# Patient Record
Sex: FEMALE | Race: AMERICAN INDIAN OR ALASKA NATIVE | Employment: FULL TIME | URBAN - METROPOLITAN AREA
[De-identification: names, ages, dates, MRNs, and addresses within clinical notes are randomized per-mention and may not be internally consistent; named-entity substitution may affect disease eponyms.]

---

## 2020-05-18 ENCOUNTER — APPOINTMENT (OUTPATIENT)
Dept: GENERAL RADIOLOGY | Facility: HOSPITAL | Age: 55
DRG: 177 | End: 2020-05-18
Attending: EMERGENCY MEDICINE
Payer: COMMERCIAL

## 2020-05-18 ENCOUNTER — HOSPITAL ENCOUNTER (INPATIENT)
Facility: HOSPITAL | Age: 55
LOS: 6 days | Discharge: HOME | DRG: 177 | End: 2020-05-24
Attending: EMERGENCY MEDICINE | Admitting: HOSPITALIST
Payer: COMMERCIAL

## 2020-05-18 DIAGNOSIS — J12.82 PNEUMONIA DUE TO COVID-19 VIRUS: Primary | ICD-10-CM

## 2020-05-18 DIAGNOSIS — U07.1 PNEUMONIA DUE TO COVID-19 VIRUS: Primary | ICD-10-CM

## 2020-05-18 DIAGNOSIS — R09.02 HYPOXIA: ICD-10-CM

## 2020-05-18 PROCEDURE — 71045 X-RAY EXAM CHEST 1 VIEW: CPT | Performed by: EMERGENCY MEDICINE

## 2020-05-18 PROCEDURE — 99223 1ST HOSP IP/OBS HIGH 75: CPT | Performed by: HOSPITALIST

## 2020-05-18 RX ORDER — MAGNESIUM OXIDE 400 MG (241.3 MG MAGNESIUM) TABLET
3 TABLET NIGHTLY
Status: DISCONTINUED | OUTPATIENT
Start: 2020-05-18 | End: 2020-05-24

## 2020-05-18 RX ORDER — ONDANSETRON 2 MG/ML
4 INJECTION INTRAMUSCULAR; INTRAVENOUS EVERY 6 HOURS PRN
Status: DISCONTINUED | OUTPATIENT
Start: 2020-05-18 | End: 2020-05-24

## 2020-05-18 RX ORDER — SIMVASTATIN 20 MG
20 TABLET ORAL NIGHTLY
COMMUNITY

## 2020-05-18 RX ORDER — ENOXAPARIN SODIUM 100 MG/ML
70 INJECTION SUBCUTANEOUS EVERY 12 HOURS SCHEDULED
Status: DISCONTINUED | OUTPATIENT
Start: 2020-05-18 | End: 2020-05-24

## 2020-05-18 RX ORDER — METHYLPREDNISOLONE SODIUM SUCCINATE 40 MG/ML
40 INJECTION, POWDER, LYOPHILIZED, FOR SOLUTION INTRAMUSCULAR; INTRAVENOUS EVERY 12 HOURS
Status: COMPLETED | OUTPATIENT
Start: 2020-05-18 | End: 2020-05-21

## 2020-05-18 RX ORDER — GLIPIZIDE 10 MG/1
10 TABLET ORAL
Status: ON HOLD | COMMUNITY
End: 2020-05-22

## 2020-05-18 RX ORDER — SODIUM CHLORIDE 0.9 % (FLUSH) 0.9 %
3 SYRINGE (ML) INJECTION AS NEEDED
Status: DISCONTINUED | OUTPATIENT
Start: 2020-05-18 | End: 2020-05-24

## 2020-05-18 RX ORDER — METOCLOPRAMIDE HYDROCHLORIDE 5 MG/ML
10 INJECTION INTRAMUSCULAR; INTRAVENOUS EVERY 8 HOURS PRN
Status: DISCONTINUED | OUTPATIENT
Start: 2020-05-18 | End: 2020-05-24

## 2020-05-18 RX ORDER — ACETAMINOPHEN 325 MG/1
650 TABLET ORAL EVERY 6 HOURS PRN
Status: DISCONTINUED | OUTPATIENT
Start: 2020-05-18 | End: 2020-05-24

## 2020-05-18 RX ORDER — LISINOPRIL 20 MG/1
40 TABLET ORAL DAILY
COMMUNITY

## 2020-05-18 RX ORDER — AMLODIPINE BESYLATE 10 MG/1
5 TABLET ORAL DAILY
COMMUNITY

## 2020-05-18 RX ORDER — DEXTROSE MONOHYDRATE 25 G/50ML
50 INJECTION, SOLUTION INTRAVENOUS
Status: DISCONTINUED | OUTPATIENT
Start: 2020-05-18 | End: 2020-05-24

## 2020-05-19 PROCEDURE — 99233 SBSQ HOSP IP/OBS HIGH 50: CPT | Performed by: HOSPITALIST

## 2020-05-19 PROCEDURE — 99221 1ST HOSP IP/OBS SF/LOW 40: CPT | Performed by: INTERNAL MEDICINE

## 2020-05-19 RX ORDER — ALBUTEROL SULFATE 90 UG/1
1-2 AEROSOL, METERED RESPIRATORY (INHALATION) EVERY 6 HOURS PRN
Status: DISCONTINUED | OUTPATIENT
Start: 2020-05-19 | End: 2020-05-24

## 2020-05-19 RX ORDER — LISINOPRIL 40 MG/1
40 TABLET ORAL DAILY
Status: DISCONTINUED | OUTPATIENT
Start: 2020-05-19 | End: 2020-05-23

## 2020-05-19 RX ORDER — HYDROCODONE BITARTRATE AND HOMATROPINE METHYLBROMIDE ORAL SOLUTION 5; 1.5 MG/5ML; MG/5ML
5 LIQUID ORAL EVERY 4 HOURS PRN
Status: DISCONTINUED | OUTPATIENT
Start: 2020-05-19 | End: 2020-05-24

## 2020-05-19 RX ORDER — ASCORBIC ACID 500 MG
500 TABLET ORAL 2 TIMES DAILY
Status: DISCONTINUED | OUTPATIENT
Start: 2020-05-19 | End: 2020-05-24

## 2020-05-19 RX ORDER — INSULIN ASPART 100 [IU]/ML
12 INJECTION, SOLUTION INTRAVENOUS; SUBCUTANEOUS ONCE
Status: COMPLETED | OUTPATIENT
Start: 2020-05-19 | End: 2020-05-19

## 2020-05-19 RX ORDER — ZINC SULFATE 50(220)MG
220 CAPSULE ORAL DAILY
Status: DISCONTINUED | OUTPATIENT
Start: 2020-05-19 | End: 2020-05-24

## 2020-05-19 RX ORDER — AMLODIPINE BESYLATE 5 MG/1
5 TABLET ORAL DAILY
Status: DISCONTINUED | OUTPATIENT
Start: 2020-05-19 | End: 2020-05-23

## 2020-05-19 RX ORDER — CALCIUM CARBONATE 200(500)MG
500 TABLET,CHEWABLE ORAL
Status: DISCONTINUED | OUTPATIENT
Start: 2020-05-19 | End: 2020-05-23

## 2020-05-19 RX ORDER — HYDROCODONE BITARTRATE AND ACETAMINOPHEN 5; 325 MG/1; MG/1
1 TABLET ORAL EVERY 6 HOURS PRN
Status: DISCONTINUED | OUTPATIENT
Start: 2020-05-19 | End: 2020-05-24

## 2020-05-19 NOTE — PLAN OF CARE
Problem: Diabetes/Glucose Control  Goal: Glucose maintained within prescribed range  Description  INTERVENTIONS:  - Monitor Blood Glucose as ordered  - Assess for signs and symptoms of hyperglycemia and hypoglycemia  - Administer ordered medications to m Glucose as ordered  - Assess for signs and symptoms of hyperglycemia and hypoglycemia  - Administer ordered medications to maintain glucose within target range  - Assess barriers to adequate nutritional intake and initiate nutrition consult as needed  - In

## 2020-05-19 NOTE — ED NOTES
Orders for admission, patient is aware of plan and ready to go upstairs.  Any questions, please call ED RN LEANDRO SALGADO  at [address]

## 2020-05-19 NOTE — PLAN OF CARE
A/Ox4. Pt ambulating in room freely. Fall risk precautions appropriate for pt: bed in lowest position, call light within reach. Tachycardic and dyspnea with exertion. On 2L of Oxygen. Incentive Spirometer implemented. Pt instructed on how to use it.   Tel perspectives and choices   Outcome: Progressing     Problem: RESPIRATORY - ADULT  Goal: Achieves optimal ventilation and oxygenation  Description  INTERVENTIONS:  - Assess for changes in respiratory status  - Assess for changes in mentation and behavior  -

## 2020-05-19 NOTE — H&P
[facility]    PATIENT'S NAME: Rey Bravo PHYSICIAN: Juan M Brown MD   PATIENT ACCOUNT#:   [id number]    LOCATION:  [place] [address]  MEDICAL RECORD #:   [id number]       YOB: 1965  ADMISSION DATE:       05/18/20 GENERAL:  Alert. Oriented to time, place, and person. Moderate distress. VITAL SIGNS:  Temperature 98.2, pulse 95, respiratory rate 38, blood pressure 162/88, pulse ox 88% on room air, 95% on 2L nasal cannula oxygen. HEENT:  Atraumatic.   Oropharynx

## 2020-05-19 NOTE — PAYOR COMM NOTE
--------------  ADMISSION REVIEW     Payor: [address] [place] [address] #:  [id number]  Authorization Number: [id number]    Admit date: 5/18/20  Admit time: 2143       Admitting Physician: Aruna Arenas MD  Attending Physician:  Kane Bains MD 171/88   Pulse 103   Resp (!) 38   Temp 98.2 °F (36.8 °C)   Temp src Oral   SpO2 (!) 88 %   O2 Device None (Room air)       Current:BP (!) 162/88   Pulse 95   Temp 98.2 °F (36.8 °C) (Oral)   Resp (!) 38   Ht 162.6 cm (5' 4\")   Wt 120.2 kg   SpO2 95%   BMI components:       Result Value    Rapid SARS-CoV-2 by PCR Detected (*)     All other components within normal limits   CBC W/ DIFFERENTIAL - Abnormal; Notable for the following components:    WBC 12.2 (*)     Neutrophil Absolute Prelim 8.07 (*)     Neutrop and findings discussed with patient including need for follow up    Admission disposition: 5/18/2020  8:01 PM         Critical care  A total of 35 minutes of critical care time (exclusive of billable procedures) was administered to manage the patient's res Inflammatory markers still pending. Patient was saturating 88% on room air. Started on oxygen, and she will be admitted to the hospital for further management.      PAST MEDICAL HISTORY:  Morbid obesity, diabetes mellitus type 2, hypertension, hyperlipide through XII are intact. ASSESSMENT AND PLAN:    1. COVID-19 pneumonia with hypoxemia. 2.   Diabetes mellitus type 2.  3.   Morbid obesity. 4.   Hypertension. Patient will be admitted to general medical floor. Droplet isolation.   Oxygen protoco tab TABS 3 mg     Date Action Dose Route User    5/18/2020 2235 Given 3 mg Oral Waunita Kehr, RN      methylPREDNISolone Sodium Succ (Solu-MEDROL) injection 40 mg     Date Action Dose Route User    5/19/2020 0924 Given 40 mg Intravenous Mike Dave

## 2020-05-19 NOTE — ED NOTES
Patient here with increasing SOB, poor appetite. +diarrhea. Patient had positive covid contact. She has attempted to treat at home but today it became worse.

## 2020-05-19 NOTE — CM/SW NOTE
Spoke with the patient about OON status and she states, “my brother was a [facility] and they said they are not equipped to treat these patients.” Pt. requesting to stay at [facility] for [facility] treatment.

## 2020-05-19 NOTE — PROGRESS NOTES
[facility]    Progress Note    Lakeisha Double Patient Status:  Inpatient    1965 MRN [id number]   Location [facility] 5SW/SE Attending Lupillo Gaines MD   Hosp Day # 1 PCP Anders Councilman, MD       Subjective:     Pt Levemir and mealtime insulin increased  - ISS    HTN  - cont lisinopril and norvasc    Morbid obesity  Body mass index is 42.81 kg/m².      dvt proph - lovenox     Code status:  Full    >35 minutes spent    Waqas Jimenez MD  5/19/2020

## 2020-05-19 NOTE — CONSULTS
[facility]    Report of Consultation    Lorie Hayes Patient Status:  Inpatient    1965 MRN [id number]   Location [facility] 5SW/SE Attending Glenn Delgado MD   Hosp Day # 1 PCP Virgel Romberg, MD     Date of Ad Q12H  glucose (DEX4) oral liquid 15 g, 15 g, Oral, Q15 Min PRN    Or  Glucose-Vitamin C (DEX-4) chewable tab 4 tablet, 4 tablet, Oral, Q15 Min PRN    Or  dextrose 50 % injection 50 mL, 50 mL, Intravenous, Q15 Min PRN    Or  glucose (DEX4) oral liquid 30 g, soft, non tender  Extremities: no clubbing, cyanosis, edema  Neurologic: no gross motor deficits  Skin: warm, dry    Results:   Laboratory Data  Lab Results   Component Value Date    WBC 8.5 05/19/2020    HGB 12.9 05/19/2020    HCT 37.0 05/19/2020    PLT 3

## 2020-05-19 NOTE — ED PROVIDER NOTES
Patient Seen in: [facility] AND [facility] Emergency Department      History   Patient presents with:  Dyspnea RAJI SOB    Stated Complaint: sob    HPI    The patient is a 57-year-old female who presents with 13 days of fatigue headaches body aches subjective fev heart sounds. No murmur. Pulmonary:      Effort: Pulmonary effort is normal. Tachypnea present. No accessory muscle usage or respiratory distress. Breath sounds: Normal breath sounds.    Abdominal:      General: Bowel sounds are normal. There is no d LDH   FERRITIN   LACTIC ACID, PLASMA   PROCALCITONIN   RAINBOW DRAW BLUE   RAINBOW DRAW LAVENDER   RAINBOW DRAW LIGHT GREEN   RAINBOW DRAW GOLD     EKG    Rate, intervals and axes as noted on EKG Report.   Rate: 102  Rhythm: Sinus Rhythm  Reading: No ST e

## 2020-05-20 PROCEDURE — 99232 SBSQ HOSP IP/OBS MODERATE 35: CPT | Performed by: INTERNAL MEDICINE

## 2020-05-20 PROCEDURE — 99233 SBSQ HOSP IP/OBS HIGH 50: CPT | Performed by: HOSPITALIST

## 2020-05-20 RX ORDER — ATORVASTATIN CALCIUM 10 MG/1
10 TABLET, FILM COATED ORAL NIGHTLY
Status: DISCONTINUED | OUTPATIENT
Start: 2020-05-20 | End: 2020-05-24

## 2020-05-20 NOTE — PLAN OF CARE
Problem: Diabetes/Glucose Control  Goal: Glucose maintained within prescribed range  Description  INTERVENTIONS:  - Monitor Blood Glucose as ordered  - Assess for signs and symptoms of hyperglycemia and hypoglycemia  - Administer ordered medications to m supplementation based on oxygen saturation or ABGs  - Provide Smoking Cessation handout, if applicable  - Encourage broncho-pulmonary hygiene including cough, deep breathe, Incentive Spirometry  - Assess the need for suctioning and perform as needed  - Ass

## 2020-05-20 NOTE — PLAN OF CARE
Pt up to shower today, CHARLES. Dry cough. Desats to 86% when walking. Recovers back to 95-96 % after rest. Lunch , Dinner ; Dr. Javed Brady notifed both times, increased novolog and levimir.    Problem: Patient/Family Goals  Goal: Patient/Family Long Term G to report SOB or any respiratory difficulty  - Respiratory Therapy support as indicated  - Manage/alleviate anxiety  - Monitor for signs/symptoms of CO2 retention  Outcome: Progressing

## 2020-05-20 NOTE — DIETARY NOTE
ADULT NUTRITION INITIAL ASSESSMENT    Pt is at moderate nutrition risk. Pt does not meet malnutrition criteria.       RECOMMENDATIONS TO MD:  Order follow up in Diabetes [address] DIAGNOSIS/PROBLEM:  Unintentional weight loss related to Management and recommended f/u with outpt [facility] for further assistance.     - Coordination of nutrition care: collaboration with other providers  - Discharge and transfer of nutrition care to new setting or provider: Pt independent from home PTA, Insulin increased this admission.   Recent Labs     05/18/20  1924 05/19/20  0551 05/20/20  0634   * 380* 310*   BUN 12 14 23*   CREATSERUM 0.88 0.81 0.84   CA 9.4 9.2 9.7   * 132* 131*   K 4.5 4.5 4.6   CL 98 100 100   CO2 24.0 22.0 23.0   OSM

## 2020-05-20 NOTE — PROGRESS NOTES
[facility]     Progress Note        Desmond Frausto Patient Status:  Inpatient    1965 MRN [id number]   Location [facility] 5SW/SE Attending Meaghan Glass MD   Hosp Day # 2 PCP Olga Cox MD       Subjective:   Seen a (Solu-MEDROL) injection 40 mg, 40 mg, Intravenous, Q12H  glucose (DEX4) oral liquid 15 g, 15 g, Oral, Q15 Min PRN    Or  Glucose-Vitamin C (DEX-4) chewable tab 4 tablet, 4 tablet, Oral, Q15 Min PRN    Or  dextrose 50 % injection 50 mL, 50 mL, Intravenous, distress  3. Obesity     Plan   -Presents with evidence of worsening dyspnea, fatigue, fevers. COVID-19 positive.  -Chest x-ray on 5/18/2020 with patchy bibasilar infiltrates seen.  -Currently on 2 L nasal cannula oxygen. Wean as tolerated.   -Continue t

## 2020-05-20 NOTE — PROGRESS NOTES
[facility]  Progress Note     Latonya Stout  : 1965    Status: Inpatient  Day #: 2    Attending: Tara Nieves MD  PCP: Destinee Abreu MD      Assessment and Plan     COVID-19 pneumonia  Acute hypoxemic respiratory failure  - C GFRAA 85 94  --   --  90  --    GFRNAA 74 81  --   --  78  --    CA 9.4 9.2  --   --  9.7  --    * 132*  --   --  131*  --    K 4.5 4.5  --   --  4.6  --    CL 98 100  --   --  100  --    CO2 24.0 22.0  --   --  23.0  --    * 380*  --   --

## 2020-05-21 PROCEDURE — 99232 SBSQ HOSP IP/OBS MODERATE 35: CPT | Performed by: INTERNAL MEDICINE

## 2020-05-21 PROCEDURE — 99233 SBSQ HOSP IP/OBS HIGH 50: CPT | Performed by: HOSPITALIST

## 2020-05-21 NOTE — PROGRESS NOTES
[facility]  Progress Note     Desmond Jett  : 1965    Status: Inpatient  Day #: 3    Attending: Ky Rodriguez MD  PCP: Olga Cox MD      Assessment and Plan     COVID-19 pneumonia  Acute hypoxemic respiratory failure  - C --  27*   CREATSERUM 0.81  --   --  0.84  --   --  0.85   GFRAA 94  --   --  90  --   --  89   GFRNAA 81  --   --  78  --   --  77   CA 9.2  --   --  9.7  --   --  10.3*   *  --   --  131*  --   --  134*   K 4.5  --   --  4.6  --   --  4.9

## 2020-05-21 NOTE — PAYOR COMM NOTE
--------------  CONTINUED STAY REVIEW    Payor: [address] [place] [address] #:  [id number]  Authorization Number: [id number]    Admit date: 5/18/20  Admit time: 2143    Admitting Physician: Fidelina Kelly MD  Attending Physician:  Isidra Walls MD  [facility] --   --  182*   DDIMER 1.78*  --   --   --  0.87* 0.60*  --    CRP 15.70*  --   --  8.01*  --   --  3.59*   TROP  --  0.048* 0.049*  --   --   --   --    Janet Story MD  5/21/2020 12:43 PM     05/21/20 1202 97.3 °F (36.3 °C) 80 18 139/65 94 % — Nasal luis Calcium Carbonate Antacid (TUMS) chewable tab 500 mg     Date Action Dose Route User    5/20/2020 2133 Given 500 mg Oral Audra Limb, RN      Enoxaparin Sodium (LOVENOX) 80 MG/0.8ML injection 70 mg     Date Action Dose Route User    5/21/2020 2078 Given RN      methylPREDNISolone Sodium Succ (Solu-MEDROL) injection 40 mg     Date Action Dose Route User    5/21/2020 1108 Given 40 mg Intravenous Leo Vasquez RN    5/20/2020 2134 Given 40 mg Intravenous Ila Gonzalez RN      Vitamin C tab 500 mg     D

## 2020-05-21 NOTE — PROGRESS NOTES
[facility] - [facility]    Progress Note      Assessment and Plan:   1. Coronavirus pneumonia–patient has fairly extensive bibasilar infiltrates. She is breathing about the same as yesterday.   Her inflammatory markers are only mildly elevated and are

## 2020-05-22 ENCOUNTER — APPOINTMENT (OUTPATIENT)
Dept: GENERAL RADIOLOGY | Facility: HOSPITAL | Age: 55
DRG: 177 | End: 2020-05-22
Attending: INTERNAL MEDICINE
Payer: COMMERCIAL

## 2020-05-22 PROCEDURE — 71045 X-RAY EXAM CHEST 1 VIEW: CPT | Performed by: INTERNAL MEDICINE

## 2020-05-22 PROCEDURE — 99232 SBSQ HOSP IP/OBS MODERATE 35: CPT | Performed by: INTERNAL MEDICINE

## 2020-05-22 PROCEDURE — 99233 SBSQ HOSP IP/OBS HIGH 50: CPT | Performed by: HOSPITALIST

## 2020-05-22 RX ORDER — ZINC SULFATE 50(220)MG
220 CAPSULE ORAL DAILY
Qty: 30 CAPSULE | Refills: 0 | Status: SHIPPED | COMMUNITY
Start: 2020-05-23

## 2020-05-22 RX ORDER — MAGNESIUM OXIDE 400 MG (241.3 MG MAGNESIUM) TABLET
1 TABLET NIGHTLY
Qty: 30 TABLET | Refills: 0 | Status: SHIPPED | COMMUNITY
Start: 2020-05-22

## 2020-05-22 RX ORDER — ASCORBIC ACID 500 MG
500 TABLET ORAL 2 TIMES DAILY
Qty: 60 TABLET | Refills: 0 | Status: SHIPPED | COMMUNITY
Start: 2020-05-22

## 2020-05-22 NOTE — DIETARY NOTE
Brief Nutrition Note    Received consult for pt being at nutrition risk. Pt being followed by nutrition. Please see note from 5/20 for full nutrition assessment. Per RN, pt eating well and drinking ONS.      [facility] Room  [address], [address] [facility], / Serge Gracia

## 2020-05-22 NOTE — CM/SW NOTE
MDO received- advanced directives. Pt is on contact & droplet isolation. Covid+. Per RN Jacquie Rob in IDT rounds this morning, pt is A&Ox4. Per RN, pt is interested in discussing code status. RN will discuss with MDs.   [facility]ORTA provided [facility] and POLST for

## 2020-05-22 NOTE — PROGRESS NOTES
[facility]  [facility] Note     Patsy Lau  : 1965    Status: Inpatient  Day #: 4    Attending: Woodrow Brooke MD  PCP: Ronal Banks MD      Assessment and Plan     COVID-19 pneumonia  Acute hypoxemic respiratory failure  - C 84.9 85.2   MCH 28.7 29.3 28.9   MCHC 34.0 34.5 33.9   RDW 12.1 12.2 12.3   NEPRELIM 13.13* 14.15* 11.05*     Recent Labs   Lab 05/19/20  0551 05/19/20  1023 05/19/20  1456 05/20/20  0634 05/20/20  0635 05/21/20  0610 05/21/20  0635 05/22/20  0554   BUN 14

## 2020-05-22 NOTE — PROGRESS NOTES
[facility]    Progress Note      Assessment and Plan:   1. Coronavirus pneumonia–patient has fairly extensive bibasilar infiltrates. She is breathing about the same as yesterday.   Her inflammatory markers are only mildly elevated and are

## 2020-05-22 NOTE — PAYOR COMM NOTE
--------------  CONTINUED STAY REVIEW    Payor: [address] [place] [address] #:  [id number]  Authorization Number: [id number]    Admit date: 5/18/20  Admit time: 2143    Admitting Physician: Sandford Duane, MD  Attending Physician:  Luz Shook MD  [facility] 05/22/20 0442 — — — — 95 % — None (Room air) —    05/22/20 0406 — — — — 91 % — None (Room air) —    05/22/20 0402 — — — — 85 %Abnormal  — None (Room air) —    05/22/20 0350 97.6 °F (36.4 °C) 70 18 133/69 94 % — None (Room air) —    05/22/20 0150 Given 25 Units Subcutaneous (Left Upper Abdomen) Maurojus Prince RN    5/21/2020 1115 Given 25 Units Subcutaneous (Left Upper Abdomen) Maurojus Prince RN    5/21/2020 4491 Given 25 Units Subcutaneous (Left Upper Abdomen) Mauro Prince RN

## 2020-05-23 PROCEDURE — 99233 SBSQ HOSP IP/OBS HIGH 50: CPT | Performed by: HOSPITALIST

## 2020-05-23 PROCEDURE — 99232 SBSQ HOSP IP/OBS MODERATE 35: CPT | Performed by: INTERNAL MEDICINE

## 2020-05-23 RX ORDER — CALCIUM CARBONATE 200(500)MG
1000 TABLET,CHEWABLE ORAL 3 TIMES DAILY PRN
Status: DISCONTINUED | OUTPATIENT
Start: 2020-05-23 | End: 2020-05-24

## 2020-05-23 RX ORDER — LISINOPRIL 20 MG/1
20 TABLET ORAL DAILY
Status: DISCONTINUED | OUTPATIENT
Start: 2020-05-24 | End: 2020-05-24

## 2020-05-23 RX ORDER — SODIUM CHLORIDE 9 MG/ML
INJECTION, SOLUTION INTRAVENOUS CONTINUOUS
Status: DISCONTINUED | OUTPATIENT
Start: 2020-05-23 | End: 2020-05-23

## 2020-05-23 NOTE — PROGRESS NOTES
[facility]  Progress Note     Lakeisha Double  : 1965    Status: Inpatient  Day #: 5    Attending: Manan Partida MD  PCP: Anders Councilman, MD      Assessment and Plan     COVID-19 pneumonia  Acute hypoxemic respiratory failure  - O 17.9* 17.5*   HGB 13.7 14.2 14.1   HCT 40.3 41.2 41.6   .0* 499.0* 470.0*   RBC 4.77 4.85 4.88   MCV 84.5 84.9 85.2   MCH 28.7 29.3 28.9   MCHC 34.0 34.5 33.9   RDW 12.1 12.2 12.3   NEPRELIM 13.13* 14.15* 11.05*     Recent Labs   Lab 05/19/20  0577 Glucose-Vitamin C **OR** dextrose **OR** glucose **OR** Glucose-Vitamin C, sodium chloride 0.9%      Spent >35 minutes, >50% of the time counseling coordinating care. Discussed plan of care.      Rommel Rock MD

## 2020-05-23 NOTE — PROGRESS NOTES
[facility] - [facility]     Progress Note        Zena Tenorio Patient Status:  Inpatient    1965 MRN [id number]   Location [facility] 5SW/SE Attending Jomar Grewal MD   Hosp Day # 5 PCP Chico Byrd MD       Subjective:   Seen a (DEX-4) chewable tab 4 tablet, 4 tablet, Oral, Q15 Min PRN    Or  dextrose 50 % injection 50 mL, 50 mL, Intravenous, Q15 Min PRN    Or  glucose (DEX4) oral liquid 30 g, 30 g, Oral, Q15 Min PRN    Or  Glucose-Vitamin C (DEX-4) chewable tab 8 tablet, 8 table

## 2020-05-24 VITALS
HEART RATE: 93 BPM | DIASTOLIC BLOOD PRESSURE: 67 MMHG | OXYGEN SATURATION: 92 % | TEMPERATURE: 98 F | SYSTOLIC BLOOD PRESSURE: 127 MMHG | WEIGHT: 249.63 LBS | RESPIRATION RATE: 20 BRPM | BODY MASS INDEX: 42.62 KG/M2 | HEIGHT: 64 IN

## 2020-05-24 PROCEDURE — 99239 HOSP IP/OBS DSCHRG MGMT >30: CPT | Performed by: HOSPITALIST

## 2020-05-24 NOTE — DISCHARGE SUMMARY
[facility]  Discharge Summary     Katy Batch  : 1965    Status: Inpatient  Day #: 6    Attending: Jd Mckinney MD  PCP: Mercy Lipscomb MD     Date of Admission: 2020  Date of Discharge: 2020     Hospital Discharge Normocephalic, atraumatic  Neck:  Supple, symmetrical  Cardiac:  Regular rate, regular rhythm  Pulmonary:  Clear to auscultation bilaterally, respirations unlabored  Gastrointestinal:  Soft, non-tender, normal bowel sounds  Musculoskeletal:  No joint swell glipiZIDE 10 MG Tabs  Commonly known as:  GLUCOTROL              Where to Get Your Medications      Please  your prescriptions at the location directed by your doctor or nurse    Bring a paper prescription for each of these medications  · Insulin

## 2020-05-24 NOTE — PLAN OF CARE
Problem: Diabetes/Glucose Control  Goal: Glucose maintained within prescribed range  Description  INTERVENTIONS:  - Monitor Blood Glucose as ordered  - Assess for signs and symptoms of hyperglycemia and hypoglycemia  - Administer ordered medications to m minimize respiratory effort  - Oxygen supplementation based on oxygen saturation or ABGs  - Provide Smoking Cessation handout, if applicable  - Encourage broncho-pulmonary hygiene including cough, deep breathe, Incentive Spirometry  - Assess the need for s

## 2020-05-26 ENCOUNTER — PATIENT OUTREACH (OUTPATIENT)
Dept: CASE MANAGEMENT | Age: 55
End: 2020-05-26

## 2020-05-26 ENCOUNTER — TELEPHONE (OUTPATIENT)
Dept: MEDSURG UNIT | Facility: HOSPITAL | Age: 55
End: 2020-05-26

## 2020-05-26 ENCOUNTER — TELEPHONE (OUTPATIENT)
Dept: INTERNAL MEDICINE UNIT | Facility: HOSPITAL | Age: 55
End: 2020-05-26

## 2020-05-26 RX ORDER — INSULIN LISPRO 100 [IU]/ML
20 INJECTION, SOLUTION INTRAVENOUS; SUBCUTANEOUS
COMMUNITY

## 2020-05-26 NOTE — TELEPHONE ENCOUNTER
VM received from pharmacist stating Aspart insulin prescribed by Dr Soy White was not covered by patients insurance plan. Pharmacist requesting change to Humalog insulin. Discussed w hospitalist THERESA Davis whom approved the switch.

## 2020-05-27 NOTE — PROGRESS NOTES
2nd attempt PCP apt request     Dr. Tricia Lynne, [facility]  [address]  [phone]

## 2020-05-28 NOTE — PAYOR COMM NOTE
--------------  CONTINUED STAY REVIEW    Payor: [address] [place] [address] #:  [id number]  Authorization Number: [id number]    Admit date: 5/18/20  Admit time: 2143    Admitting Physician: Fidelina Kelly MD  Attending Physician:  No att. providers fo fatigue, fevers.  COVID-19 positive.  -Chest x-ray on 5/18/2020 with patchy bibasilar infiltrates seen.  -Weaned off oxygen  -Continue to follow inflammatory markers at this time  -Continue to follow fever curve  -Vitamin C and zinc  -Discontinue steroid t

## 2020-05-28 NOTE — PAYOR COMM NOTE
--------------  DISCHARGE REVIEW    Payor: [address] [place] [address] #:  [id number]  Authorization Number: [id number]    Admit date: 5/18/20  Admit time:  2143  Discharge Date: 5/24/2020  3:07 PM     Admitting Physician: Clinton Neff MD  Attending

## 2020-05-28 NOTE — PROGRESS NOTES
3rd attempt PCP apt request     Dr. Oliver Lindquist, [address]  [address], [place] [address] [phone]    Unable to contact patient to schedule apt. Patient is OON for [facility].

## (undated) NOTE — LETTER
Hospital Discharge Documentation  Please phone to schedule a hospital follow up appointment.     From: [address] Hospitalist's Office  Phone: [phone]    Patient discharged time/date: 5/24/2020  3:07 PM  Patient discharge disposition:  Home or Self - Levemir and mealtime insulin  - ISS  - Improving now that finished with steroids  - will d/c on premeal novolog +SS and increased dose of lantus     HTN  - cont lisinopril and norvasc     Morbid obesity  Body mass index is 42.81 kg/m².      Consultants CONTINUE taking these medications      Instructions Prescription details   amLODIPine Besylate 10 MG Tabs  Commonly known as:  NORVASC      Take 5 mg by mouth daily. Refills:  0     aspirin 81 MG Chew      Take 81 mg by mouth daily.    Refills:  0     lis